# Patient Record
Sex: MALE | Race: WHITE | Employment: FULL TIME | URBAN - METROPOLITAN AREA
[De-identification: names, ages, dates, MRNs, and addresses within clinical notes are randomized per-mention and may not be internally consistent; named-entity substitution may affect disease eponyms.]

---

## 2017-05-25 ENCOUNTER — TELEPHONE (OUTPATIENT)
Dept: SURGERY | Age: 42
End: 2017-05-25

## 2018-01-26 ENCOUNTER — OFFICE VISIT (OUTPATIENT)
Dept: PRIMARY CARE CLINIC | Age: 43
End: 2018-01-26

## 2018-01-26 VITALS
SYSTOLIC BLOOD PRESSURE: 127 MMHG | OXYGEN SATURATION: 96 % | RESPIRATION RATE: 16 BRPM | DIASTOLIC BLOOD PRESSURE: 87 MMHG | TEMPERATURE: 97.9 F | HEIGHT: 73 IN | HEART RATE: 83 BPM | BODY MASS INDEX: 36.08 KG/M2 | WEIGHT: 272.2 LBS

## 2018-01-26 DIAGNOSIS — R52 BODY ACHES: ICD-10-CM

## 2018-01-26 DIAGNOSIS — J01.00 ACUTE MAXILLARY SINUSITIS, RECURRENCE NOT SPECIFIED: ICD-10-CM

## 2018-01-26 DIAGNOSIS — H65.93 BILATERAL NON-SUPPURATIVE OTITIS MEDIA: Primary | ICD-10-CM

## 2018-01-26 LAB
QUICKVUE INFLUENZA TEST: NEGATIVE
VALID INTERNAL CONTROL?: YES

## 2018-01-26 RX ORDER — AZITHROMYCIN 250 MG/1
TABLET, FILM COATED ORAL
Qty: 6 TAB | Refills: 0 | Status: SHIPPED | OUTPATIENT
Start: 2018-01-26

## 2018-01-26 NOTE — PATIENT INSTRUCTIONS
Ear Infection (Otitis Media): Care Instructions  Your Care Instructions    An ear infection may start with a cold and affect the middle ear (otitis media). It can hurt a lot. Most ear infections clear up on their own in a couple of days. Most often you will not need antibiotics. This is because many ear infections are caused by a virus. Antibiotics don't work against a virus. Regular doses of pain medicines are the best way to reduce your fever and help you feel better. Follow-up care is a key part of your treatment and safety. Be sure to make and go to all appointments, and call your doctor if you are having problems. It's also a good idea to know your test results and keep a list of the medicines you take. How can you care for yourself at home? · Take pain medicines exactly as directed. ¨ If the doctor gave you a prescription medicine for pain, take it as prescribed. ¨ If you are not taking a prescription pain medicine, take an over-the-counter medicine, such as acetaminophen (Tylenol), ibuprofen (Advil, Motrin), or naproxen (Aleve). Read and follow all instructions on the label. ¨ Do not take two or more pain medicines at the same time unless the doctor told you to. Many pain medicines have acetaminophen, which is Tylenol. Too much acetaminophen (Tylenol) can be harmful. · Plan to take a full dose of pain reliever before bedtime. Getting enough sleep will help you get better. · Try a warm, moist washcloth on the ear. It may help relieve pain. · If your doctor prescribed antibiotics, take them as directed. Do not stop taking them just because you feel better. You need to take the full course of antibiotics. When should you call for help? Call your doctor now or seek immediate medical care if:  ? · You have new or increasing ear pain. ? · You have new or increasing pus or blood draining from your ear. ? · You have a fever with a stiff neck or a severe headache. ? Watch closely for changes in your health, and be sure to contact your doctor if:  ? · You have new or worse symptoms. ? · You are not getting better after taking an antibiotic for 2 days. Where can you learn more? Go to http://ramon-lamin.info/. Enter W609 in the search box to learn more about \"Ear Infection (Otitis Media): Care Instructions. \"  Current as of: May 12, 2017  Content Version: 11.4  © 7492-6673 Healthwise, [employer]. Care instructions adapted under license by [employer] (which disclaims liability or warranty for this information). If you have questions about a medical condition or this instruction, always ask your healthcare professional. [place] [address] any warranty or liability for your use of this information.

## 2018-01-26 NOTE — PROGRESS NOTES
Chief Complaint   Patient presents with    Sweats     Chills, sweats, body aches, diarrhea on yesterday, fatigue

## 2018-01-26 NOTE — MR AVS SNAPSHOT
Daniel Melo 
 
 
 [address] [place], [place] [phone] Patient: Lisa Gross Crew MRN: [id number] :1975 Visit Information Date & Time Provider Department Dept. Phone Encounter #  
 2018 10:15 AM [id number] Follow-up Instructions Return if symptoms worsen or fail to improve. Upcoming Health Maintenance Date Due DTaP/Tdap/Td series (1 - Tdap) 10/21/1995 Allergies as of 2018  Review Complete On: 2018 By: Thomas Pitts LPN No Known Allergies Current Immunizations  Never Reviewed No immunizations on file. Not reviewed this visit You Were Diagnosed With   
  
 Codes Comments Bilateral non-suppurative otitis media    -  Primary ICD-10-CM: H65.93 
ICD-9-CM: 381.4 Body aches     ICD-10-CM: R52 ICD-9-CM: 780.96 Acute maxillary sinusitis, recurrence not specified     ICD-10-CM: J01.00 ICD-9-CM: 461.0 Vitals BP Pulse Temp Resp Height(growth percentile) Weight(growth percentile) 127/87 83 97.9 °F (36.6 °C) (Oral) 16 6' 1\" (1.854 m) 272 lb 3.2 oz (123.5 kg) SpO2 BMI Smoking Status 96% 35.91 kg/m2 Former Smoker Vitals History BMI and BSA Data Body Mass Index Body Surface Area  
 35.91 kg/m 2 2.52 m 2 Preferred Pharmacy Pharmacy Name Phone [facility] [address] [phone] Your Updated Medication List  
  
   
This list is accurate as of: 18 10:43 AM.  Always use your most recent med list.  
  
  
  
  
 azithromycin 250 mg tablet Commonly known as:  Cynthia Alcantar Take by mouth, take two tablets today then one tablet daily for 4 more days. CALCIUM 600 WITH VITAMIN D3 600 mg(1,500mg) -400 unit Cap Generic drug:  Calcium-Cholecalciferol (D3) Take  by mouth. famotidine 20 mg tablet Commonly known as:  PEPCID Take 1 Tab by mouth nightly. multivitamin with iron tablet Take 1 Tab by mouth daily. ZYRTEC PO Take  by mouth nightly. Prescriptions Sent to Pharmacy Refills  
 azithromycin (ZITHROMAX) 250 mg tablet 0 Sig: Take by mouth, take two tablets today then one tablet daily for 4 more days. Class: Normal  
 Pharmacy: [address] [facility] Ph #: [phone] We Performed the Following AMB POC RAPID INFLUENZA TEST [92107 CPT(R)] Follow-up Instructions Return if symptoms worsen or fail to improve. Patient Instructions Ear Infection (Otitis Media): Care Instructions Your Care Instructions An ear infection may start with a cold and affect the middle ear (otitis media). It can hurt a lot. Most ear infections clear up on their own in a couple of days. Most often you will not need antibiotics. This is because many ear infections are caused by a virus. Antibiotics don't work against a virus. Regular doses of pain medicines are the best way to reduce your fever and help you feel better. Follow-up care is a key part of your treatment and safety. Be sure to make and go to all appointments, and call your doctor if you are having problems. It's also a good idea to know your test results and keep a list of the medicines you take. How can you care for yourself at home? · Take pain medicines exactly as directed. ¨ If the doctor gave you a prescription medicine for pain, take it as prescribed. ¨ If you are not taking a prescription pain medicine, take an over-the-counter medicine, such as acetaminophen (Tylenol), ibuprofen (Advil, Motrin), or naproxen (Aleve). Read and follow all instructions on the label. ¨ Do not take two or more pain medicines at the same time unless the doctor told you to.  Many pain medicines have acetaminophen, which is Tylenol. Too much acetaminophen (Tylenol) can be harmful. · Plan to take a full dose of pain reliever before bedtime. Getting enough sleep will help you get better. · Try a warm, moist washcloth on the ear. It may help relieve pain. · If your doctor prescribed antibiotics, take them as directed. Do not stop taking them just because you feel better. You need to take the full course of antibiotics. When should you call for help? Call your doctor now or seek immediate medical care if: 
? · You have new or increasing ear pain. ? · You have new or increasing pus or blood draining from your ear. ? · You have a fever with a stiff neck or a severe headache. ? Watch closely for changes in your health, and be sure to contact your doctor if: 
? · You have new or worse symptoms. ? · You are not getting better after taking an antibiotic for 2 days. Where can you learn more? Go to http://ramon-lamin.info/. Enter F181 in the search box to learn more about \"Ear Infection (Otitis Media): Care Instructions. \" Current as of: May 12, 2017 Content Version: 11.4 © 2388-8719 [employer]. Care instructions adapted under license by [employer] (which disclaims liability or warranty for this information). If you have questions about a medical condition or this instruction, always ask your healthcare professional. [address] any warranty or liability for your use of this information. Introducing [facility]! [facility] introduces PharmaNation patient portal. Now you can access parts of your medical record, email your doctor's office, and request medication refills online. 1. In your internet browser, go to [email or web address] 2. Click on the First Time User? Click Here link in the Sign In box. You will see the New Member Sign Up page. 3. Enter your PharmaNation Access Code exactly as it appears below.  You will not need to use this code after youve completed the sign-up process. If you do not sign up before the expiration date, you must request a new code. · LPATH Access Code: [email or web address] Expires: 4/26/2018 10:41 AM 
 
4. Enter the last four digits of your Social Security Number (xxxx) and Date of Birth (mm/dd/yyyy) as indicated and click Submit. You will be taken to the next sign-up page. 5. Create a LPATH ID. This will be your LPATH login ID and cannot be changed, so think of one that is secure and easy to remember. 6. Create a LPATH password. You can change your password at any time. 7. Enter your Password Reset Question and Answer. This can be used at a later time if you forget your password. 8. Enter your e-mail address. You will receive e-mail notification when new information is available in [address]. 9. Click Sign Up. You can now view and download portions of your medical record. 10. Click the Download Summary menu link to download a portable copy of your medical information. If you have questions, please visit the Frequently Asked Questions section of the LPATH website. Remember, LPATH is NOT to be used for urgent needs. For medical emergencies, dial 911. Now available from your iPhone and Android! Please provide this summary of care documentation to your next provider. Your primary care clinician is listed as Elo Nye. If you have any questions after today's visit, please call [phone].

## 2018-01-26 NOTE — LETTER
NOTIFICATION RETURN TO WORK / SCHOOL 
 
1/26/2018 10:43 AM 
 
Mr. Francia Pate 
[address] [id number] To Whom It May Concern: 
 
Francia Pate is currently under the care of [address] [place] [address]. He will return to work/school on: 01-29-18 If there are questions or concerns please have the patient contact our office.  
 
 
 
Sincerely, 
 
 
Andrew Grossman NP

## 2018-01-26 NOTE — PROGRESS NOTES
Subjective:   Rema Holley is a 36 y.o. male who complains of congestion, sneezing, sore throat, swollen glands, nasal blockage, post nasal drip, productive cough, headache and bilateral sinus pain for 1 days, gradually worsening since that time. He denies a history of shortness of breath and wheezing. Evaluation to date: none. Treatment to date: OTC products. Patient does not smoke cigarettes. Relevant PMH: No pertinent additional PMH. Patient Active Problem List   Diagnosis Code    DJD (degenerative joint disease) M19.90    Chronic venous insufficiency I87.2    Status following surgery for weight loss Z98.84     Patient Active Problem List    Diagnosis Date Noted    Status following surgery for weight loss 05/11/2015    Chronic venous insufficiency 07/10/2014    DJD (degenerative joint disease) 04/22/2014     Current Outpatient Prescriptions   Medication Sig Dispense Refill    azithromycin (ZITHROMAX) 250 mg tablet Take by mouth, take two tablets today then one tablet daily for 4 more days. 6 Tab 0    famotidine (PEPCID) 20 mg tablet Take 1 Tab by mouth nightly. 30 Tab 11    Calcium-Cholecalciferol, D3, (CALCIUM 600 WITH VITAMIN D3) 600 mg(1,500mg) -400 unit cap Take  by mouth.  multivitamin with iron tablet Take 1 Tab by mouth daily.  CETIRIZINE HCL (ZYRTEC PO) Take  by mouth nightly.        No Known Allergies  Past Medical History:   Diagnosis Date    Anxiety     Morbid obesity ([address]     Musculoskeletal disorder     CRACKED VERTEBRA LOWER BACK    Pulmonary embolism ([address] August 2014    post op    Unspecified sleep apnea     cpap     Past Surgical History:   Procedure Laterality Date    HX GASTRECTOMY  7/24/14    Laparoscopic sleeve gastrectomy Dr. Merari Salas     Family History   Problem Relation Age of Onset    Hypertension Mother     Anesth Problems Neg Hx      Social History   Substance Use Topics    Smoking status: Former Smoker     Packs/day: 2.00     Years: 15.00 Quit date: 1/10/2009    Smokeless tobacco: Never Used    Alcohol use 3.0 oz/week     6 Cans of beer per week        Review of Systems  Pertinent items are noted in HPI. Objective:     Visit Vitals    /87    Pulse 83    Temp 97.9 °F (36.6 °C) (Oral)    Resp 16    Ht 6' 1\" (1.854 m)    Wt 272 lb 3.2 oz (123.5 kg)    SpO2 96%    BMI 35.91 kg/m2     General:  alert, cooperative, no distress   Eyes: negative   Ears: abnormal TM AD - erythematous, bulging, abnormal TM AS - erythematous, bulging   Sinuses: Normal paranasal sinuses without tenderness   Mouth:  Lips, mucosa, and tongue normal. Teeth and gums normal and abnormal findings: mild oropharyngeal erythema   Neck: supple, symmetrical, trachea midline and no adenopathy. Heart: S1 and S2 normal, no murmurs noted. Lungs: clear to auscultation bilaterally   Abdomen: soft, non-tender. Bowel sounds normal. No masses,  no organomegaly        Assessment/Plan:   otitis media  Suggested symptomatic OTC remedies. RTC prn. Discussed diagnosis and treatment of viral URIs. Discussed the importance of avoiding unnecessary antibiotic therapy. ICD-10-CM ICD-9-CM    1. Bilateral non-suppurative otitis media H65.93 381.4    2. Body aches R52 780.96 AMB POC RAPID INFLUENZA TEST   3. Acute maxillary sinusitis, recurrence not specified J01.00 461.0 azithromycin (ZITHROMAX) 250 mg tablet   .

## 2023-03-04 ENCOUNTER — OFFICE VISIT (OUTPATIENT)
Dept: PRIMARY CARE CLINIC | Age: 48
End: 2023-03-04

## 2023-03-04 VITALS
HEART RATE: 110 BPM | DIASTOLIC BLOOD PRESSURE: 87 MMHG | HEIGHT: 73 IN | WEIGHT: 294.4 LBS | RESPIRATION RATE: 18 BRPM | BODY MASS INDEX: 39.02 KG/M2 | SYSTOLIC BLOOD PRESSURE: 125 MMHG | OXYGEN SATURATION: 95 % | TEMPERATURE: 97 F

## 2023-03-04 DIAGNOSIS — M70.31 BURSITIS OF RIGHT ELBOW, UNSPECIFIED BURSA: Primary | ICD-10-CM

## 2023-03-04 DIAGNOSIS — L03.113 CELLULITIS OF RIGHT ELBOW: ICD-10-CM

## 2023-03-04 DIAGNOSIS — M25.421 ELBOW SWELLING, RIGHT: ICD-10-CM

## 2023-03-04 LAB
ANION GAP SERPL CALC-SCNC: 8 MMOL/L (ref 5–15)
BUN SERPL-MCNC: 11 MG/DL (ref 6–20)
BUN/CREAT SERPL: 9 (ref 12–20)
CALCIUM SERPL-MCNC: 8.8 MG/DL (ref 8.5–10.1)
CHLORIDE SERPL-SCNC: 110 MMOL/L (ref 97–108)
CO2 SERPL-SCNC: 23 MMOL/L (ref 21–32)
COMMENT, HOLDF: NORMAL
CREAT SERPL-MCNC: 1.21 MG/DL (ref 0.7–1.3)
GLUCOSE SERPL-MCNC: 105 MG/DL (ref 65–100)
POTASSIUM SERPL-SCNC: 4.4 MMOL/L (ref 3.5–5.1)
SAMPLES BEING HELD,HOLD: NORMAL
SODIUM SERPL-SCNC: 141 MMOL/L (ref 136–145)

## 2023-03-04 RX ORDER — DOXYCYCLINE 100 MG/1
100 CAPSULE ORAL 2 TIMES DAILY
Qty: 20 CAPSULE | Refills: 0 | Status: SHIPPED | OUTPATIENT
Start: 2023-03-04 | End: 2023-03-14

## 2023-03-04 NOTE — PROGRESS NOTES
Chief Complaint   Patient presents with    Elbow Pain     Right elbow swollen and red with warm to touch x 2 days. 1. \"Have you been to the ER, urgent care clinic since your last visit? Hospitalized since your last visit? \" No    2. \"Have you seen or consulted any other health care providers outside of the [address] [place] [address] since your last visit? \" No            3 most recent PHQ Screens 3/4/2023   Little interest or pleasure in doing things Not at all   Feeling down, depressed, irritable, or hopeless Not at all   Total Score PHQ 2 0       Health Maintenance Due   Topic Date Due    Depression Screen  Never done    COVID-19 Vaccine (1) Never done    DTaP/Tdap/Td series (1 - Tdap) Never done    Colorectal Cancer Screening Combo  Never done    Lipid Screen  07/07/2020    Flu Vaccine (1) Never done

## 2023-03-04 NOTE — PROGRESS NOTES
Chief Complaint   Patient presents with    Elbow Pain     Right elbow swollen and red with warm to touch x 2 days. HISTORY OF PRESENT ILLNESS  Ramo Dsouza is a 49 y.o. male. Onset two days with pain and redness to right elbow. He reports redness is extending down arm. No hx of gout. No injury. Taking daily advil. Hx of PE nine years ago after surgery, taking ASA. Yesterday felt feverish but no fever. Recent travel to [facility]. Review of Systems   Constitutional: Negative. Musculoskeletal:  Positive for joint pain. Skin:  Positive for rash. Physical Exam  Vitals and nursing note reviewed. Cardiovascular:      Rate and Rhythm: Normal rate. Pulmonary:      Effort: Pulmonary effort is normal. No respiratory distress. Musculoskeletal:      Right elbow: Swelling present. No deformity, effusion or lacerations. Decreased range of motion. Tenderness present in olecranon process. Left elbow: Swelling present. No deformity, effusion or lacerations. Cervical back: Neck supple. Neurological:      Mental Status: He is alert. Past Medical History:   Diagnosis Date    Anxiety     Morbid obesity ([address]     Musculoskeletal disorder     CRACKED VERTEBRA LOWER BACK    P[facility] August 2014    post op    Unspecified sleep apnea     cpap     Past Surgical History:   Procedure Laterality Date    HX GASTRECTOMY  7/24/14    Laparoscopic sleeve gastrectomy Dr. Eagle Jones     /87 (BP 1 Location: Left upper arm, BP Patient Position: Sitting, BP Cuff Size: Large adult)   Pulse (!) 110   Temp 97 °F (36.1 °C) (Temporal)   Resp 18   Ht 6' 1\" (1.854 m)   Wt 294 lb 6.4 oz (133.5 kg)   SpO2 95%   BMI 38.84 kg/m²    ASSESSMENT and PLAN  1. Bursitis of right elbow, unspecified bursa  -     METABOLIC PANEL, BASIC; Future  2. Cellulitis of right elbow  -     CBC WITH AUTOMATED DIFF; Future  -     SED RATE (ESR); Future  -     doxycycline (VIBRAMYCIN) 100 mg capsule;  Take 1 Capsule by mouth two (2) times a day for 10 days. , Normal, Disp-20 Capsule, R-0  -     METABOLIC PANEL, BASIC; Future  3. Elbow swelling, right  -     URIC ACID; Future  -     CBC WITH AUTOMATED DIFF; Future  -     SED RATE (ESR); Future  -     METABOLIC PANEL, BASIC; Future  Labs pending, will review on follow up Monday (2 days). Take medication as prescribed. Continue Advil prn for pain, apply ice. Instructed to go to ED for worsening symptoms. Advised to return in two days for follow up.   Adriana Sweeney, DEANDRA

## 2023-03-06 ENCOUNTER — OFFICE VISIT (OUTPATIENT)
Dept: PRIMARY CARE CLINIC | Age: 48
End: 2023-03-06

## 2023-03-06 VITALS
HEART RATE: 110 BPM | HEIGHT: 73 IN | BODY MASS INDEX: 39.36 KG/M2 | OXYGEN SATURATION: 96 % | TEMPERATURE: 98.3 F | RESPIRATION RATE: 18 BRPM | SYSTOLIC BLOOD PRESSURE: 145 MMHG | DIASTOLIC BLOOD PRESSURE: 89 MMHG | WEIGHT: 297 LBS

## 2023-03-06 DIAGNOSIS — M25.421 ELBOW SWELLING, RIGHT: Primary | ICD-10-CM

## 2023-03-06 DIAGNOSIS — L03.113 CELLULITIS OF RIGHT ELBOW: ICD-10-CM

## 2023-03-06 DIAGNOSIS — M70.21 OLECRANON BURSITIS OF RIGHT ELBOW: ICD-10-CM

## 2023-03-06 PROCEDURE — 99214 OFFICE O/P EST MOD 30 MIN: CPT | Performed by: NURSE PRACTITIONER

## 2023-03-06 NOTE — PROGRESS NOTES
Chief Complaint   Patient presents with    Arm Pain     Right elbow; started Thursday; swollen/reddened/tender to touch; slight improvement from [address]  Rogelio Christy is a 49 y.o. male. He is here today for follow up on his right arm pain and swelling. He was seen by myself two days ago dx with cellulitis/bursitis. He reports redness has improved. He has been taking ibuprofen seldom. He denies fever. Review of Systems   Constitutional: Negative. Musculoskeletal:  Positive for joint pain. Physical Exam  Vitals and nursing note reviewed. Cardiovascular:      Rate and Rhythm: Normal rate. Pulmonary:      Effort: Pulmonary effort is normal.   Musculoskeletal:      Right elbow: Swelling present. Normal range of motion. Tenderness present in olecranon process. Cervical back: Neck supple. Neurological:      Mental Status: He is alert.      Past Medical History:   Diagnosis Date    Anxiety     Morbid obesity ([address]     Musculoskeletal disorder     CRACKED VERTEBRA LOWER BACK    Pulmonary embolism [facility]) August 2014    post op    Unspecified sleep apnea     cpap     Past Surgical History:   Procedure Laterality Date    HX GASTRECTOMY  7/24/14    Laparoscopic sleeve gastrectomy Dr. Nell Murcia     BP (!) 145/89 (BP 1 Location: Left arm, BP Patient Position: Sitting)   Pulse (!) 110   Temp 98.3 °F (36.8 °C) (Temporal)   Resp 18   Ht 6' 1\" (1.854 m)   Wt 297 lb (134.7 kg)   SpO2 96%   BMI 39.18 kg/m²   Results for orders placed or performed in visit on 49/67/48   METABOLIC PANEL, BASIC   Result Value Ref Range    Sodium 141 136 - 145 mmol/L    Potassium 4.4 3.5 - 5.1 mmol/L    Chloride 110 (H) 97 - 108 mmol/L    CO2 23 21 - 32 mmol/L    Anion gap 8 5 - 15 mmol/L    Glucose 105 (H) 65 - 100 mg/dL    BUN 11 6 - 20 MG/DL    Creatinine 1.21 0.70 - 1.30 MG/DL    BUN/Creatinine ratio 9 (L) 12 - 20      eGFR >60 >60 ml/min/1.73m2    Calcium 8.8 8.5 - 10.1 MG/DL    ASSESSMENT and PLAN  1. Elbow swelling, right  -     SED RATE (ESR); Future  -     CBC WITH AUTOMATED DIFF; Future  -     URIC ACID; Future  2. Olecranon bursitis of right elbow  3. Cellulitis of right elbow  Labs redrawn as discrepancy in the lab two days ago, pending and will call with results/follow up. Cellulitis is resolving, continue doxy. Take Aleve or ibuprofen twice daily and apply ice.  May consider ortho evaluation  Instructed to follow up if does not resolve fully with completed antibiotics  DEANDRA Avila

## 2023-03-06 NOTE — PROGRESS NOTES
Identified pt with two pt identifiers(name and ). Reviewed record in preparation for visit and have obtained necessary documentation. Chief Complaint   Patient presents with    Arm Pain     Right elbow; started Thursday; swollen/reddened/tender to touch; slight improvement from saturday      Vitals:    23 1514   BP: (!) 145/89   Pulse: (!) 110   Resp: 18   Temp: 98.3 °F (36.8 °C)   TempSrc: Temporal   SpO2: 96%   Weight: 297 lb (134.7 kg)   Height: 6' 1\" (1.854 m)   PainSc:   2   PainLoc: Elbow       Health Maintenance Review: Patient reminded of \"due or due soon\" health maintenance. I have asked the patient to contact his/her primary care provider (PCP) for follow-up on his/her health maintenance. Coordination of Care Questionnaire:  :   1) Have you been to an emergency room, urgent care, or hospitalized since your last visit? If yes, where when, and reason for visit? no       2. Have seen or consulted any other health care provider since your last visit? If yes, where when, and reason for visit? NO      Patient is accompanied by self I have received verbal consent from [facility] to discuss any/all medical information while they are present in the room.

## 2023-03-08 ENCOUNTER — OFFICE VISIT (OUTPATIENT)
Dept: PRIMARY CARE CLINIC | Age: 48
End: 2023-03-08

## 2023-03-08 VITALS
WEIGHT: 295 LBS | SYSTOLIC BLOOD PRESSURE: 158 MMHG | HEIGHT: 73 IN | HEART RATE: 101 BPM | RESPIRATION RATE: 20 BRPM | TEMPERATURE: 98.1 F | BODY MASS INDEX: 39.1 KG/M2 | OXYGEN SATURATION: 95 % | DIASTOLIC BLOOD PRESSURE: 96 MMHG

## 2023-03-08 DIAGNOSIS — J20.9 ACUTE BRONCHITIS, UNSPECIFIED ORGANISM: Primary | ICD-10-CM

## 2023-03-08 LAB
BASOPHILS # BLD: 0.1 K/UL (ref 0–0.1)
BASOPHILS NFR BLD: 1 % (ref 0–1)
DIFFERENTIAL METHOD BLD: ABNORMAL
EOSINOPHIL # BLD: 0.1 K/UL (ref 0–0.4)
EOSINOPHIL NFR BLD: 2 % (ref 0–7)
ERYTHROCYTE [DISTWIDTH] IN BLOOD BY AUTOMATED COUNT: 12.4 % (ref 11.5–14.5)
ERYTHROCYTE [SEDIMENTATION RATE] IN BLOOD: 49 MM/HR (ref 0–15)
HCT VFR BLD AUTO: 41.2 % (ref 36.6–50.3)
HGB BLD-MCNC: 13.7 G/DL (ref 12.1–17)
IMM GRANULOCYTES # BLD AUTO: 0.1 K/UL (ref 0–0.04)
IMM GRANULOCYTES NFR BLD AUTO: 1 % (ref 0–0.5)
LYMPHOCYTES # BLD: 1.4 K/UL (ref 0.8–3.5)
LYMPHOCYTES NFR BLD: 19 % (ref 12–49)
MCH RBC QN AUTO: 33.1 PG (ref 26–34)
MCHC RBC AUTO-ENTMCNC: 33.3 G/DL (ref 30–36.5)
MCV RBC AUTO: 99.5 FL (ref 80–99)
MONOCYTES # BLD: 1.2 K/UL (ref 0–1)
MONOCYTES NFR BLD: 16 % (ref 5–13)
NEUTS SEG # BLD: 4.6 K/UL (ref 1.8–8)
NEUTS SEG NFR BLD: 61 % (ref 32–75)
NRBC # BLD: 0 K/UL (ref 0–0.01)
NRBC BLD-RTO: 0 PER 100 WBC
PLATELET # BLD AUTO: 200 K/UL (ref 150–400)
PMV BLD AUTO: 11 FL (ref 8.9–12.9)
RBC # BLD AUTO: 4.14 M/UL (ref 4.1–5.7)
URATE SERPL-MCNC: 6.9 MG/DL (ref 3.5–7.2)
WBC # BLD AUTO: 7.4 K/UL (ref 4.1–11.1)

## 2023-03-08 PROCEDURE — 99213 OFFICE O/P EST LOW 20 MIN: CPT

## 2023-03-08 RX ORDER — PROMETHAZINE HYDROCHLORIDE AND DEXTROMETHORPHAN HYDROBROMIDE 6.25; 15 MG/5ML; MG/5ML
5 SYRUP ORAL
Qty: 120 ML | Refills: 0 | Status: SHIPPED | OUTPATIENT
Start: 2023-03-08 | End: 2023-03-15

## 2023-03-08 RX ORDER — ALBUTEROL SULFATE 90 UG/1
1 AEROSOL, METERED RESPIRATORY (INHALATION)
Qty: 18 G | Refills: 0 | Status: SHIPPED | OUTPATIENT
Start: 2023-03-08

## 2023-03-08 RX ORDER — PREDNISONE 20 MG/1
20 TABLET ORAL 2 TIMES DAILY
Qty: 10 TABLET | Refills: 0 | Status: SHIPPED | OUTPATIENT
Start: 2023-03-08 | End: 2023-03-13

## 2023-03-08 NOTE — PROGRESS NOTES
Chief Complaint   Patient presents with    Cough     Cough since Saturday-negative Covid test on Monday     Visit Vitals  BP (!) 158/96   Pulse (!) 101   Temp 98.1 °F (36.7 °C) (Oral)   Resp 20   Ht 6' 1\" (1.854 m)   Wt 295 lb (133.8 kg)   SpO2 95%   BMI 38.92 kg/m²

## 2023-03-08 NOTE — PROGRESS NOTES
HISTORY OF PRESENT ILLNESS  Lorie Kan is a 49 y.o. male. Chief Complaint   Patient presents with    Cough     Cough since Saturday-negative Covid test on Monday   Patient reports x 3 days of progressive dry/hacking cough. Reports using deslym, mucinex and advil with mild improvement in symptoms. Denies fevers although felt flushed. Denies sick contact although he is . Currently on doxycycline for right arm cellulitis/bursitis. Review of Systems   Respiratory:  Positive for cough (dry/hacking). All other systems reviewed and are negative. Physical Exam  Constitutional:       Appearance: Normal appearance. He is obese. HENT:      Head: Normocephalic. Right Ear: Tympanic membrane normal.      Left Ear: Tympanic membrane normal.      Nose: Nose normal.      Mouth/Throat:      Mouth: Mucous membranes are moist.      Pharynx: Oropharynx is clear. No oropharyngeal exudate or posterior oropharyngeal erythema. Cardiovascular:      Rate and Rhythm: Normal rate. Pulses: Normal pulses. Heart sounds: Normal heart sounds. Pulmonary:      Effort: Pulmonary effort is normal.      Breath sounds: Normal breath sounds. No stridor. No wheezing, rhonchi or rales. Musculoskeletal:      Right upper arm: Normal.      Comments: No abnormalities noted, no swelling/tenderness to elbow/forearm. Lymphadenopathy:      Cervical: No cervical adenopathy. Skin:     General: Skin is warm. Neurological:      Mental Status: He is alert.      Past Medical History:   Diagnosis Date    Anxiety     Morbid obesity ([address]     Musculoskeletal disorder     CRACKED VERTEBRA LOWER BACK    Pulmonary embolism [facility]) August 2014    post op    Unspecified sleep apnea     cpap     Past Surgical History:   Procedure Laterality Date    HX GASTRECTOMY  7/24/14    Laparoscopic sleeve gastrectomy Dr. Jaimie Felix     BP (!) 158/96   Pulse (!) 101   Temp 98.1 °F (36.7 °C) (Oral)   Resp 20   Ht 6' 1\" (1.854 m)   Wt 295 lb (133.8 kg)   SpO2 95%   BMI 38.92 kg/m²     ASSESSMENT and PLAN  1. Acute bronchitis, unspecified organism  -     albuterol (PROVENTIL HFA, VENTOLIN HFA, PROAIR HFA) 90 mcg/actuation inhaler; Take 1 Puff by inhalation every four (4) hours as needed for Cough., Normal, Disp-18 g, R-0  -     predniSONE (DELTASONE) 20 mg tablet; Take 1 Tablet by mouth two (2) times a day for 5 days. , Normal, Disp-10 Tablet, R-0  -     promethazine-dextromethorphan (PROMETHAZINE-DM) 6.25-15 mg/5 mL syrup; Take 5 mL by mouth every four (4) hours as needed for Cough for up to 7 days. , Normal, Disp-120 mL, R-0        - Discussed with patient treatment course and recommended rest, push fluids, and take tylenol for fever or symptom relief as needed. Instructed to seek additional care if does not resolve or symptoms worsens.      Caryle Lose, NP

## 2023-03-09 ENCOUNTER — TELEPHONE (OUTPATIENT)
Dept: PRIMARY CARE CLINIC | Age: 48
End: 2023-03-09

## 2023-03-09 NOTE — TELEPHONE ENCOUNTER
Labs reviewed. Left detailed message and follow up with Ortho if persists .       Signed By: DEANDRA Booker     March 9, 2023

## 2023-03-16 ENCOUNTER — OFFICE VISIT (OUTPATIENT)
Dept: PRIMARY CARE CLINIC | Age: 48
End: 2023-03-16

## 2023-03-16 VITALS
HEART RATE: 77 BPM | DIASTOLIC BLOOD PRESSURE: 86 MMHG | BODY MASS INDEX: 39.1 KG/M2 | HEIGHT: 73 IN | WEIGHT: 295 LBS | RESPIRATION RATE: 18 BRPM | SYSTOLIC BLOOD PRESSURE: 122 MMHG | OXYGEN SATURATION: 97 % | TEMPERATURE: 98 F

## 2023-03-16 DIAGNOSIS — J20.9 ACUTE BRONCHITIS, UNSPECIFIED ORGANISM: Primary | ICD-10-CM

## 2023-03-16 PROCEDURE — 99213 OFFICE O/P EST LOW 20 MIN: CPT | Performed by: NURSE PRACTITIONER

## 2023-03-16 RX ORDER — AZITHROMYCIN 250 MG/1
250 TABLET, FILM COATED ORAL SEE ADMIN INSTRUCTIONS
Qty: 6 TABLET | Refills: 0 | Status: SHIPPED | OUTPATIENT
Start: 2023-03-16 | End: 2023-03-21

## 2023-03-16 RX ORDER — BENZONATATE 200 MG/1
200 CAPSULE ORAL
Qty: 20 CAPSULE | Refills: 0 | Status: SHIPPED | OUTPATIENT
Start: 2023-03-16 | End: 2023-03-23

## 2023-03-16 NOTE — PROGRESS NOTES
Chief Complaint   Patient presents with    Cold Symptoms     Cough. March 5th. Seen here twice. HISTORY OF PRESENT ILLNESS  Italia Alvarez is a 49 y.o. male. He is here for cough. Cough started March 5th with associated cold symptoms. He describes as mostly dry hacking cough mildly productive,breathing is fine. No fever, body aches, chills. Inhaler makes jittery, no improvements with medications prescribed on 3/8. Continues to feel Run down and tired. Of note, [facility]/cellulitis of right elbow. Starting on keflex. No chest pain. Former smoker. Negative home covid tests. Review of Systems   Constitutional: Negative. HENT:  Negative for congestion. Respiratory:  Positive for cough. Negative for shortness of breath and wheezing. Cardiovascular: Negative. Physical Exam  Vitals and nursing note reviewed. HENT:      Right Ear: Tympanic membrane normal.      Left Ear: Tympanic membrane normal.      Nose: Nose normal.      Mouth/Throat:      Mouth: Mucous membranes are moist.      Pharynx: Oropharynx is clear. No oropharyngeal exudate or posterior oropharyngeal erythema. Cardiovascular:      Rate and Rhythm: Normal rate and regular rhythm. Pulmonary:      Effort: Pulmonary effort is normal. No respiratory distress. Breath sounds: No wheezing, rhonchi or rales. Musculoskeletal:      Cervical back: Neck supple. Neurological:      Mental Status: He is alert.      Past Medical History:   Diagnosis Date    Anxiety     Morbid obesity ([address]     Musculoskeletal disorder     CRACKED VERTEBRA LOWER BACK    Pulmonary embolism [facility]) August 2014    post op    Unspecified sleep apnea     cpap     Past Surgical History:   Procedure Laterality Date    HX GASTRECTOMY  7/24/14    Laparoscopic sleeve gastrectomy Dr. Damon Lee     /86 (BP 1 Location: Left arm, BP Patient Position: Sitting, BP Cuff Size: Adult)   Pulse 77   Temp 98 °F (36.7 °C) (Temporal)   Resp 18 Ht 6' 1\" (1.854 m)   Wt 295 lb (133.8 kg)   SpO2 97%   BMI 38.92 kg/m²    ASSESSMENT and PLAN  1. Acute bronchitis, unspecified organism  -     azithromycin (ZITHROMAX) 250 mg tablet; Take 1 Tablet by mouth See Admin Instructions for 5 days. , Normal, Disp-6 Tablet, R-0  -     benzonatate (TESSALON) 200 mg capsule; Take 1 Capsule by mouth three (3) times daily as needed for Cough for up to 7 days. , Normal, Disp-20 Capsule, R-0  Reassured unremarkable pulmonary. Discussed with patient could be post viral syndrome vs. Less likely Bacterial bronchitis/PNA. X-ray unavailable today and declines outpatient hospital xray. Add otc mucinex with increased fluids to RX. Also recommended OTC probiotic as he on multiple antibiotics. If does not persist,  return or see PCP for x-ray and labs.  ED for shortness of breath, difficulty breathing, or chest pain  DEANDRA Hayes

## 2023-03-16 NOTE — PROGRESS NOTES
Identified pt with two pt identifiers(name and ). Reviewed record in preparation for visit and have obtained necessary documentation. Chief Complaint   Patient presents with    Cold Symptoms     Cough. . Seen here twice. Vitals:    23 1620   BP: 122/86   Pulse: 77   Resp: 18   Temp: 98 °F (36.7 °C)   TempSrc: Temporal   SpO2: 97%   Weight: 295 lb (133.8 kg)   Height: 6' 1\" (1.854 m)   PainSc:   0 - No pain       Health Maintenance Due   Topic    Hepatitis C Screening     COVID-19 Vaccine (1)    DTaP/Tdap/Td series (1 - Tdap)    Colorectal Cancer Screening Combo     Flu Vaccine (1)       Coordination of Care Questionnaire:  :   1) Have you been to an emergency room, urgent care, or hospitalized since your last visit? If yes, where when, and reason for visit? no       2. Have seen or consulted any other health care provider since your last visit? If yes, where when, and reason for visit? NO      Patient is accompanied by self I have received verbal consent from [id number] [facility] [address] to discuss any/all medical information while they are present in the room. An electronic signature was used to authenticate this note.   -- Osei Moura LPN

## 2023-05-18 RX ORDER — ALBUTEROL SULFATE 90 UG/1
1 AEROSOL, METERED RESPIRATORY (INHALATION) EVERY 4 HOURS PRN
COMMUNITY
Start: 2023-03-08

## 2023-05-18 RX ORDER — FAMOTIDINE 20 MG/1
20 TABLET, FILM COATED ORAL
COMMUNITY
Start: 2015-10-16

## 2023-12-29 ENCOUNTER — OFFICE VISIT (OUTPATIENT)
Age: 48
End: 2023-12-29

## 2023-12-29 VITALS
SYSTOLIC BLOOD PRESSURE: 130 MMHG | RESPIRATION RATE: 20 BRPM | HEART RATE: 85 BPM | WEIGHT: 290.8 LBS | TEMPERATURE: 97.8 F | OXYGEN SATURATION: 95 % | HEIGHT: 73 IN | DIASTOLIC BLOOD PRESSURE: 89 MMHG | BODY MASS INDEX: 38.54 KG/M2

## 2023-12-29 DIAGNOSIS — J20.9 ACUTE BRONCHITIS, UNSPECIFIED ORGANISM: Primary | ICD-10-CM

## 2023-12-29 RX ORDER — AZITHROMYCIN 250 MG/1
250 TABLET, FILM COATED ORAL SEE ADMIN INSTRUCTIONS
Qty: 6 TABLET | Refills: 0 | Status: SHIPPED | OUTPATIENT
Start: 2023-12-29 | End: 2024-01-03

## 2023-12-29 RX ORDER — METHYLPREDNISOLONE 4 MG/1
TABLET ORAL
Qty: 1 KIT | Refills: 0 | Status: SHIPPED | OUTPATIENT
Start: 2023-12-29 | End: 2024-01-04

## 2023-12-29 RX ORDER — ALBUTEROL SULFATE 90 UG/1
2 AEROSOL, METERED RESPIRATORY (INHALATION) 4 TIMES DAILY PRN
Qty: 18 G | Refills: 0 | Status: SHIPPED | OUTPATIENT
Start: 2023-12-29

## 2024-06-11 ENCOUNTER — TELEPHONE (OUTPATIENT)
Age: 49
End: 2024-06-11

## 2024-06-11 NOTE — TELEPHONE ENCOUNTER
Called patient 06/11/24  to screen and inform them about [employer] recommendations that  patients  who have undergone Sleeve Gastrectomy > 3 years post op have routine surveillance upper endoscopy to evaluate for any esophageal changes   LM with Pt VM:  yes  Spoke with patient.  No   Surgeon:  Dr. Arora   Date of Surgery:  7/24/14    Chart review indicates no follow up with us for several years.     Jil Saravia, PA